# Patient Record
(demographics unavailable — no encounter records)

---

## 2025-06-20 NOTE — HISTORY OF PRESENT ILLNESS
[FreeTextEntry1] : complete physical exam  [de-identified] : Acute complaints  always tired and needs to take nap every day  falls asleep right away  been going on for the last 2 years   Also has been gaining weight 1 of 3 pant sizes over the past 2 years. Did start Zoloft around that time as well.  For postpartum and anxiety Currently on Zoloft 150 mg through NP psych  headaches every day over the past 2 months  takes [facility] migraine relief 2-3 times a week one side throbbing left side  eyes were just checked and no vision issues Does not get photophobia or phonophobia but sometimes they can be very debilitating Usually occurs between 2-3p  But can sometimes happen at any time Very rarely will wake up with a headache  - Mammogram: No family history of breast cancer - Colonoscopy: No family history of colon cancer

## 2025-06-20 NOTE — PLAN
[FreeTextEntry1] : Follow-up in 2 to 3 months for headache reassessment  I have spent 30 minutes of time on the encounter on acute issues which excludes teaching and separately reported services of the preventative exam

## 2025-06-20 NOTE — PHYSICAL EXAM
[Normal Outer Ear/Nose] : the outer ears and nose were normal in appearance [Normal Oropharynx] : the oropharynx was normal [No JVD] : no jugular venous distention [Normal] : normal rate, regular rhythm, normal S1 and S2 and no murmur heard [Soft] : abdomen soft [Non Tender] : non-tender [Grossly Normal Strength/Tone] : grossly normal strength/tone [Coordination Grossly Intact] : coordination grossly intact [No Focal Deficits] : no focal deficits [Normal Affect] : the affect was normal [Normal Insight/Judgement] : insight and judgment were intact

## 2025-06-20 NOTE — HEALTH RISK ASSESSMENT
[Good] : ~his/her~  mood as  good [No] : In the past 12 months have you used drugs other than those required for medical reasons? No [No falls in past year] : Patient reported no falls in the past year [Little interest or pleasure doing things] : 1) Little interest or pleasure doing things [Feeling down, depressed, or hopeless] : 2) Feeling down, depressed, or hopeless [0] : 2) Feeling down, depressed, or hopeless: Not at all (0) [PHQ-2 Negative - No further assessment needed] : PHQ-2 Negative - No further assessment needed [Patient reported PAP Smear was normal] : Patient reported PAP Smear was normal [With Family] : lives with family [# of Members in Household ___] :  household currently consist of [unfilled] member(s) [Employed] : employed [College] : College [] :  [# Of Children ___] : has [unfilled] children [Sexually Active] : sexually active [Feels Safe at Home] : Feels safe at home [Fully functional (bathing, dressing, toileting, transferring, walking, feeding)] : Fully functional (bathing, dressing, toileting, transferring, walking, feeding) [Fully functional (using the telephone, shopping, preparing meals, housekeeping, doing laundry, using] : Fully functional and needs no help or supervision to perform IADLs (using the telephone, shopping, preparing meals, housekeeping, doing laundry, using transportation, managing medications and managing finances) [Smoke Detector] : smoke detector [Carbon Monoxide Detector] : carbon monoxide detector [Safety elements used in home] : safety elements used in home [Seat Belt] :  uses seat belt [Sunscreen] : uses sunscreen [Never] : Never [NO] : No [FreeTextEntry1] : 1)headaches/exhaustion 2) weight gain  [Time Spent: ___ Minutes] : I spent [unfilled] minutes performing a depression screening for this patient. [de-identified] : walks a lot at work  [de-identified] : regular diet  [[id number]] : 0 [Change in mental status noted] : No change in mental status noted [Reports changes in hearing] : Reports no changes in hearing [Reports changes in vision] : Reports no changes in vision [Reports changes in dental health] : Reports no changes in dental health [PapSmearDate] : 01/2023 [FreeTextEntry2] : Wally  [FreeTextEntry3] : 2 boys ages 2 and 4  [de-identified] : last vision exam 05/2025 [de-identified] : Last dental visit 05/2025